# Patient Record
Sex: MALE | Race: BLACK OR AFRICAN AMERICAN
[De-identification: names, ages, dates, MRNs, and addresses within clinical notes are randomized per-mention and may not be internally consistent; named-entity substitution may affect disease eponyms.]

---

## 2019-09-26 ENCOUNTER — HOSPITAL ENCOUNTER (EMERGENCY)
Dept: HOSPITAL 76 - ED | Age: 24
Discharge: HOME | End: 2019-09-26
Payer: COMMERCIAL

## 2019-09-26 VITALS — SYSTOLIC BLOOD PRESSURE: 122 MMHG | DIASTOLIC BLOOD PRESSURE: 65 MMHG
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
## 2019-09-26 DIAGNOSIS — S02.2XXA: Primary | ICD-10-CM

## 2019-09-26 PROCEDURE — 70160 X-RAY EXAM OF NASAL BONES: CPT

## 2019-09-26 PROCEDURE — 99283 EMERGENCY DEPT VISIT LOW MDM: CPT

## 2019-09-26 PROCEDURE — 99284 EMERGENCY DEPT VISIT MOD MDM: CPT

## 2019-09-26 NOTE — XRAY REPORT
Reason:  came in contact w/ another player, injuring nose..

Procedure Date:  09/26/2019   

Accession Number:  [id number] / [id number]                    

Procedure:  XR  - Nasal Bones CPT Code:  

 

FULL RESULT:

 

 

EXAM:

NASAL BONES RADIOGRAPHY

 

EXAM DATE: 9/26/2019 09:08 PM.

 

CLINICAL HISTORY: Came in contact with another player, injuring nose.

 

COMPARISONS: None.

 

TECHNIQUE: 3 views.

 

FINDINGS:

Bones: There is a 2 mm fracture at the very tip of the nasal bone. The 

anterior nasal spine is intact.

 

Sinuses: Normal. No opacities or fluid levels.

 

Other: Normal. No soft tissue swelling.

IMPRESSION: 2 mm tip of nasal bone fracture fragment.

 

RADIA

## 2019-09-26 NOTE — ED PHYSICIAN DOCUMENTATION
History of Present Illness





- Stated complaint


Stated Complaint: NOSE INJURY





- Additonal information


Additional information: 


This is a 23-year-old male who denies past medical history who presents with 

nose pain.  Patient was playing football and another player's cheekbone impacted

his nose several hours ago.  Patient had some bleeding from the left side, he 

went home and tried to clean his nose and said he could feel the cartilage 

moving around, so he presented here for further evaluation.  He denies any loss 

of consciousness, facial pain other than on his nose, vision changes, or neck 

pain.





Review of Systems


Constitutional: denies: Fever


Eyes: denies: Loss of vision


Nose: reports: Epistaxis


Throat: denies: Sore throat





PD PAST MEDICAL HISTORY





- Past Medical History


Past Medical History: No





- Present Medications


Home Medications: 


                                Ambulatory Orders











 Medication  Instructions  Recorded  Confirmed


 


Acetaminophen [Tylenol] 650 mg PO Q6H PRN #30 tab 09/26/19 


 


Ibuprofen 600 mg PO Q6H PRN #30 tablet 09/26/19 














- Allergies


Allergies/Adverse Reactions: 


                                    Allergies











Allergy/AdvReac Type Severity Reaction Status Date / Time


 


clindamycin AdvReac  Anaphylaxis Verified 09/26/19 20:40














- Living Situation


Living Arrangement: reports: At home





- Family History


Family history: reports: Non contributory





PD ED PE NORMAL





- Vitals


Vital signs reviewed: Yes





- General


General: Alert and oriented X 3, No acute distress





- HEENT


HEENT: PERRL, Other (Mild symmetric nasal swelling and distal nose tenderness. 

No gross deformity. No nasal septal hematoma. No active bleeding. No facial 

tenderness with palpation of the maxilla, zygoma, forehead, jaw. )





- Neck


Neck: Supple, no meningeal sign





- Cardiac


Cardiac: RRR





- Respiratory


Respiratory: No respiratory distress





- Abdomen


Abdomen: Non distended





- Derm


Derm: Warm and dry





- Extremities


Extremities: No deformity





- Neuro


Neuro: Alert and oriented X 3





- Psych


Psych: Normal mood, Normal affect





Results





- Vitals


Vitals: 


                               Vital Signs - 24 hr











  09/26/19 09/26/19





  20:35 21:28


 


Temperature 37.3 C 36.9 C


 


Heart Rate 60 61


 


Respiratory 17 16





Rate  


 


Blood Pressure 133/109 H 122/65


 


O2 Saturation 100 98








                                     Oxygen











O2 Source                      Room air

















- Rads (name of study)


  ** Nasal XR


Radiology: Other (Distal nasal bone fracture.)





PD MEDICAL DECISION MAKING





- ED course


Complexity details: considered differential (Nasal fracture, epistaxis, soft 

tissue injury, facial fracture, concussion)


ED course: 


Patient presents with isolated trauma to his nose, he has no loss of 

consciousness, no signs of other significant trauma to his head or neck, he does

 not require head or neck imaging today.  He is face is stable, with no 

tenderness around the orbits, midface, or jaw, he has isolated mid-distal nasal 

tenderness. Nasal x-ray shows a 2 mm distal nasal fracture fragment.  I 

discussed the diagnosis of nasal fracture with the patient, and care for it with

 ice, and avoiding trauma.  I provided him contact information for our facial 

surgeon Dr. Velázquez, I explained that I also think it would be reasonable for him 

to follow-up with just his primary care provider to start given the mildness of 

this fracture. I reviewed return precautions including any recurrent/persistent 

epistaxis.  He has no signs of concussion or significant head injury.  There are

 no signs of nasal septal hematoma.  Patient's questions were answered and he 

was discharged home.








Departure





- Departure


Disposition: 01 Home, Self Care


Clinical Impression: 


Nasal bone fracture


Qualifiers:


 Encounter type: initial encounter Fracture type: closed Qualified Code(s): 

S02.2XXA - Fracture of nasal bones, initial encounter for closed fracture





Condition: Good


Instructions:  ED Fx Nasal Conf W X Ray


Follow-Up: 


Dennis Velázquez DDS [Provider Admit Priv/Credential] - As Needed


Prescriptions: 


Acetaminophen [Tylenol] 650 mg PO Q6H PRN #30 tab


 PRN Reason: Pain


Ibuprofen 600 mg PO Q6H PRN #30 tablet


 PRN Reason: Pain


Comments: 


You were seen today for nose pain, and you do have a small fracture of your 

nasal bone.  This will likely heal without issue. You may apply ice to it, and 

please avoid further trauma to the nose.  Please follow-up with your primary 

care provider.  I have also provided the information for a facial surgeon for 

follow up if needed.  Return to emergency department if you develop a persistent

 nosebleed that does not stop with direct pressure, or other concerning 

symptoms.


Discharge Date/Time: 09/26/19 21:45

## 2021-06-21 ENCOUNTER — HOSPITAL ENCOUNTER (OUTPATIENT)
Dept: HOSPITAL 76 - SC | Age: 26
Discharge: HOME | End: 2021-06-21
Attending: INTERNAL MEDICINE
Payer: COMMERCIAL

## 2021-06-21 VITALS — SYSTOLIC BLOOD PRESSURE: 130 MMHG | DIASTOLIC BLOOD PRESSURE: 80 MMHG

## 2021-06-21 DIAGNOSIS — R41.89: ICD-10-CM

## 2021-06-21 DIAGNOSIS — G47.10: ICD-10-CM

## 2021-06-21 DIAGNOSIS — R06.81: ICD-10-CM

## 2021-06-21 DIAGNOSIS — G47.8: ICD-10-CM

## 2021-06-21 DIAGNOSIS — E66.3: ICD-10-CM

## 2021-06-21 DIAGNOSIS — R06.83: Primary | ICD-10-CM

## 2021-06-21 PROCEDURE — 99212 OFFICE O/P EST SF 10 MIN: CPT

## 2021-06-21 PROCEDURE — 99202 OFFICE O/P NEW SF 15 MIN: CPT

## 2021-06-21 NOTE — SLEEP CARE CONSULTATION
Information from patient questionnaire entered by Bruna Engel.





I have reviewed and concur with the information entered by Bruna Engel. 

This document represents the service I personally performed and the decisions 

made by me, Miriam Beatty MD, [facility].





History of Present Illness


Service Date and Time: 06/21/2021    1435


Reason for Visit: New patient


Chief Complaint: reports: Insomnia, Snoring, Observed pauses in breathing, 

Frequent awakenings at night


Date of Onset: 4 years


Usual bedtime: 11:40pm 


Time it takes to fall asleep: 45 minutes


Snores at night: Yes


Observed to quit breathing while asleep: Yes


Sleeps alone due to snoring: Yes


Number of times waking at night: 3-4


Reasons for waking at night: reports: Snoring, Gasping for air, Bathroom, Other 

(numbness behind head, headaches, dreams)


Toss, Turn, or Twitch while sleeping: Yes


Recalls having dreams: Yes


Usually gets out of bed at: 6 - 6:30 am


Feels refreshed in the morning: Yes (sometimes)


Morning headache: Yes (resolves in 1-2 hours)


Sleepy or fatigued during the day: Yes (sometimes)


Ever fallen asleep while driving: Yes (sometimes)


Takes day naps: Yes (sometimes)


Dreams during day naps: Yes


Prior sleep studies: No


Additional HPI information: 


I had the pleasure of seeing Mr. Caban today regarding the possibility of him 

having a sleep disorder.  As you know, he is a 25 year old gentleman who 

complains of frequent awakenings, loud snore, and observed apneas.  The patient 

tells me that he normally goes to bed around 11:40 pm, and it takes him 

approximately 45 minutes to fall asleep.  He has been told that he snores loudly

and irregularly at night.  He has also been observed to stop breathing in his 

sleep.  His wife has to sleep in a separate room.  On the average he wakes up 3 

4 times a night.  Most of the time he wakes up because of having to use the 

bathroom.  He has awakened occasionally because of his own snoring, choking, and

having to gasp for air.  There is a lot of tossing and turning in his sleep.  He

has somniloquy (sleep talking) but not somnambulism (sleep walking).  Generally 

he can recall having dreams.  In the morning he usually gets up out of the bed 

around 6  6:30 a.m. not feeling refreshed nor rested.  He usually does have a 

morning headache lasting 1  2 hours.    During the day he complains of feeling 

sleepy and fatigued.  His score on [place] Sleepiness Scale is 13 out of 24.  He

has fallen asleep while driving and has gone out of the taina.  He usually does 

not take naps during the day.  He has had sleep paralysis.  He complains of res

tless leg syndrome.  He reports having impaired concentration during the day.








- Parasomnia Symptoms


Ever been unable to move upon waking from sleep: Yes (most times)


Walks in sleep: No


Talks in sleep: Yes (sometimes)


Ever acted out dreams in sleep: Yes


Ever felt weak in the knees when startled or emotional: No


Bothered by creepy, crawly, restless sensations in legs: Yes


Problems with memory or concentration: Yes





Subjective


Initial [place] Sleepiness Scale score: 13 (in 2021)





Social History


The patient's occupation is a Active . Patient is Single and lives in 

[place]. 





Have you smoked in the past 12 months: No


Alcohol use: No


Caffeine use: Yes


Caffeine amount and frequency: 1-2 drinks 5 days a week





Family History


Family history of sleep disordered breathing: Yes


Family Hx Sleep Apnea: Mother: Snoring, Father: Snoring, Sibling: Snoring, 

Grandparent: Snoring





Allergies and Home Medications


Drug allergies reviewed: Yes


Home medication list reviewed: Yes





Review of Systems


Weight gain over past 5 years: 22


Cardiovascular: reports: leg or foot swelling


Respiratory: denies: shortness of breath, wheeze, sputum production, chronic 

cough, other


Gastrointestinal: denies: heartburn, difficulty swallowing, nausea, vomitting, 

diarrhea, abdominal pain, other


Urinary: reports: frequency


Neurological: reports: headaches


Psychiatric: denies: Attention Deficit Hyperactivity, anxiety, depression, mood 

disorder, claustrophobia, other


Ear/Nose/Throat: reports: nasal congestion, sinus problems, dry mouth/throat, 

injury to nose, wisdom teeth removed


Endocrine: reports: too hot or cold


Musculoskeletal: reports: joint pain, neck pain, back pain


Immunologic: reports: sneezing, allergies to food or environment





Physical Exam


Vital signs obtained and entered by: Dr. Nikomborirak


Blood Pressure: 130/80


Cuff size: regular


Heart Rate: 52


O2 Saturation: 97


Height: 6 ft


Weight: 220 lb


Body Mass Index: 29.8


BMI Classification: Overweight


Neck circumference: 16


Mood/affect: normal


HEENT: No craniofacial malformation


Nostrils: partially obstructed


Turbinates: normal


Septum: deviated right


Mouth and throat: narrow oropharynx


Soft palate: long


Hard palate: normal


Uvula: normal


Uvula visualization: 25% Mallampati Class III


Tongue: normal in size


Tonsils: small


Chin and jaw: normal size and position


Neck: normal w/o lymphadenopathy or thyromegaly


Heart: regular rate and rhythm


Lungs: clear bilaterally


Abdomen: soft


Extremities: no edema or clubbing


Neurologic: intact





Impression and Plan


IMPRESSION: 1. Obstructive Sleep Apnea-Hypopnea Syndrome, as suggested by 

history of loud and irregular snoring, observed cessation of breath while 

asleep, frequent awakenings during the night, nocturnal choking, unrefreshed 

sleep, cognitive impairment, and daytime hypersomnolence. Narrow oropharynx and 

obesity are common predisposing factors for obstructive sleep apnea-hypopnea 

syndrome.  I recommend proceeding to polysomnography to confirm the diagnosis 

and to assess severity.  If he has significant sleep disordered breathing, a 

manual CPAP titration study will also be performed to find the optimal treatment

pressure.  I informed the patient of what the sleep studies involve and after 

some discussion, he agreed to proceed. 





Plan:  1. Schedule polysomnography + manual CPAP titration study and return in 1

to 2 weeks after the study to discuss result and initiate therapy.


   2. Avoid long distance driving or when feeling sleepy.


   3. Avoid alcohol, sedative and muscle relaxant around bedtime.


   4. Attempt to lose some weight.





Visit Type: In Office


Time Spent with Patient (minutes): 15


Provider Statement: I spent 100% of the Face to Face Visit with the patient with

greater than 50% spent counseling the patient and coordination of care.

## 2021-09-16 ENCOUNTER — HOSPITAL ENCOUNTER (OUTPATIENT)
Dept: HOSPITAL 76 - SC | Age: 26
Discharge: HOME | End: 2021-09-16
Attending: INTERNAL MEDICINE
Payer: COMMERCIAL

## 2021-09-16 DIAGNOSIS — R06.83: Primary | ICD-10-CM

## 2021-09-16 DIAGNOSIS — R41.89: ICD-10-CM

## 2021-09-16 DIAGNOSIS — R06.81: ICD-10-CM

## 2021-09-16 DIAGNOSIS — G47.8: ICD-10-CM

## 2021-09-16 DIAGNOSIS — G47.10: ICD-10-CM

## 2021-09-16 PROCEDURE — 95806 SLEEP STUDY UNATT&RESP EFFT: CPT

## 2021-09-23 ENCOUNTER — HOSPITAL ENCOUNTER (OUTPATIENT)
Dept: HOSPITAL 76 - SC | Age: 26
Discharge: HOME | End: 2021-09-23
Attending: NURSE PRACTITIONER
Payer: COMMERCIAL

## 2021-09-23 DIAGNOSIS — R06.83: Primary | ICD-10-CM

## 2021-09-23 DIAGNOSIS — G47.10: ICD-10-CM

## 2021-09-23 DIAGNOSIS — G47.8: ICD-10-CM

## 2021-09-23 DIAGNOSIS — R06.81: ICD-10-CM

## 2021-09-23 NOTE — SLEEP CARE CONSULTATION
Information from patient questionnaire entered by Ciara Anaya.





I have reviewed and concur with the information entered by Ciara Anaya. This 

document represents the service I personally performed and the decisions made by

me, Jesenia Valencia ARNP.





History of Present Illness


Service Date and Time: 09/23/2021    0900


Initial [place] Sleepiness Scale score: 13 (in 2021)


Current [place] Sleepiness Scale score: 6


Additional HPI information: 





NANDA SESAY returns via Video Telehealth visit for follow up and results of 

the recently performed home sleep study.





The patient was informed of the following findings:  No significant sleep diso

rdered breathing with an average AHI of 3.0 and dm oxygen saturation of 87%.





I explained the pathophysiology behind obstructive sleep apnea. Patient does not

have sleep apnea and was advised how weight gain could increase the risk of 

developing sleep apnea in the future. I strongly encouraged the patient to lose 

weight.





Patient has snoring. Snoring can be reduced by weight loss. Weight loss is best 

achieved with diet consult. Patient instructed to contact PCP for referral. 

Snoring can also be treated with an oral appliance from a dentist. Advised to 

check insurance coverage. In addition, an ENT evaluation can be do to see if 

other treatment is indicated. Patient does not drink alcohol.  Patient was 

cautioned about risks of drowsy driving until sleepiness symptoms resolve. 





Sleep Study





- Results


Type of Sleep Study: Home sleep study


Prior sleep studies: Yes


Year and Where: 9/2021 [facility]


Polysomnography/Home Sleep Study results: 





Physician Impression:


The quality of the study is good. The length of the study is adequate (> 240 

minutes). Please also see the


tabulated and graphic data.


1. No significant sleep disordered breathing, with an AHI of 3.0/hr and dm 

SaO2 of 87%. During the


study, the patient had 10 apneas (10 obstructive, 0 central, 0 mixed) and 10 

hypopneas. The longest


episode lasted 86.0 seconds. The few respiratory events occurred independently 

of body position


(supine AHI was 3.6 and non-supine, 2.71).


2. Hypoxemia (ICD-10 R09.02), minimal, with the lowest oxygen saturation of 87 %

and 2.1 minutes with


SaO2 under 90%. Baseline oxygen saturation was normal (Average oxygen saturation

was 94%).





Allergies and Home Medications


Home medication list reviewed: Yes (oxycodone for pain after surgery)





Review of Systems


Review of systems same as previous: No (nose surgery on 9-20-21)





Physical Exam


Vital signs obtained and entered by: Telehealth visit to reduce exposure during 

covid pandemic


Height: 6 ft





Impression and Plan





1. Suspected Obstructive Sleep Apnea-Hypopnea Syndrome, as suggested by a 

history of loud and irregular snoring, observed cessation of breath while 

asleep, gasping or choking in sleep, morning headache, frequent awakening during

the night, unrefreshed sleep, cognitive impairment, and excessive daytime 

sleepiness. Patient completed home study but felt he did not sleep normally that

night.  His symptoms are consistent with possible sleep apnea so I recommend 

proceeding to polysomnography to confirm the diagnosis and to assess severity. I

obtained agreement to proceed. The pathophysiology of obstructive sleep apnea-

hypopnea syndrome was discussed with the patient and health risks of 

cardiovascular and cerebrovascular disease if not treated. Risks of drowsy 

driving discussed in detail and patient advised to avoid long distance driving 

and to pull over at the first sign of drowsiness. Patient agreed to plan. 





* Schedule polysomnography +- manual CPAP titration study and return in 1-2 

  weeks after the study to discuss result and initiate therapy.


* Avoid long distance driving or driving when feeling sleepy.


* Avoid alcohol, sedative and muscle relaxant around bedtime.


* Attempt to lose weight.


* Review instructions provided by trained office staff on how to prepare for the

  sleep study.


* Return for follow-up after sleep study completed.








Counseling Topics: Weight loss health impact


Visit Type: Telehealth Video


Video Type: VSee


Patient Location: Home


Location of Provider: Office


Patient agrees and consents to this telehealth visit type: Yes


Patient agrees to have their insurance billed: Yes


Time Spent with Patient (minutes): 11


Provider Statement: I spent 100% of the Telehealth Video Call with the patient 

with greater than 50% spent counseling the patient and coordination of care.

## 2022-10-21 ENCOUNTER — HOSPITAL ENCOUNTER (OUTPATIENT)
Dept: HOSPITAL 76 - DI | Age: 27
Discharge: HOME | End: 2022-10-21
Attending: PHYSICIAN ASSISTANT
Payer: COMMERCIAL

## 2022-10-21 DIAGNOSIS — R22.41: ICD-10-CM

## 2022-10-21 DIAGNOSIS — M23.8X1: ICD-10-CM

## 2022-10-21 DIAGNOSIS — M70.51: ICD-10-CM

## 2022-10-21 DIAGNOSIS — S83.241A: ICD-10-CM

## 2022-10-21 DIAGNOSIS — M94.261: ICD-10-CM

## 2022-10-21 DIAGNOSIS — S83.241A: Primary | ICD-10-CM

## 2022-10-21 DIAGNOSIS — M76.892: ICD-10-CM

## 2022-10-21 DIAGNOSIS — S83.242A: Primary | ICD-10-CM

## 2022-10-21 DIAGNOSIS — M25.462: ICD-10-CM

## 2022-10-21 DIAGNOSIS — M94.262: ICD-10-CM

## 2022-10-21 DIAGNOSIS — M23.8X2: ICD-10-CM

## 2022-10-21 NOTE — MRI REPORT
PROCEDURE:  KNEE WO - RT

 

INDICATIONS:  [phone]

 

TECHNIQUE:  

Noncontrast sagittal PD fast spin echo and T2 fast spin echo with fat saturation, sagittal 3-D gradie
nt sequence with fat saturation; coronal T1 spin echo and PD fast spin echo with fat saturation, and 
axial PD fast spin echo with fat saturation through the knee.  

 

COMPARISON:  None.

 

FINDINGS:  

Image quality:  Excellent.  

 

Menisci: There is horizontal tear in the peripheral aspect of the posterior horn the medial meniscus.
 The lateral meniscus demonstrates normal morphology and internal signal.  The meniscal root ligament
s appear intact.  

 

Cruciate ligaments:  Mild mucoid degeneration of the anterior cruciate ligament. The posterior crucia
te ligament is intact.  

 

Medial structures:  The medial collateral ligament appears intact.  The semimembranosus tendon insert
ions and meniscocapsular junction appear intact.  Visualized portions of the pes anserinus tendons ap
pear normal. There is pes anserinus bursal fluid consistent with bursitis.  

 

Lateral structures:  The lateral collateral ligament and the biceps femoris tendon appear intact.  Th
e popliteus tendon appears normal.  Iliotibial band appears normal.  

 

Anterior structures:  The quadriceps and patellar tendons appear intact.  Patellar alignment is leigha
l.  No femoral trochlear dysplasia or ventral trochlear prominence.  No edema in the infrapatellar fa
t pad.  

 

Bones and cartilage:  No bone marrow contusions or fractures. Mild tricompartmental cartilage thinnin
g and fibrillation.  There is a 2.3 x 1.9 cm intramedullary mass in the proximal tibial metaphysis, d
emonstrating signal carries a 6 consistent with chondroid matrix, most likely an enchondroma.

 

Joint space:  There is small knee joint effusion.  No Baker's cyst.  Normal appearing synovial plicae
 are incidentally noted.  

 

IMPRESSION:

 

1. Horizontal tear of the peripheral aspect of the posterior horn of the medial meniscus.

2. Mild mucoid degeneration of ACL.

3. Pes pes anserinus bursitis.

4. Mild tricompartmental cartilage thinning and fibrillation.

5 A 2.3 x 1.9 cm intramedullary mass with chondroid matrix, most likely an enchondroma. Recommend a 2
-view x-ray of the tibia for further evaluation.

 

Reviewed by: Kaitlin Chao MD on 10/21/2022 2:35 PM PDT

Approved by: Kaitlin Chao MD on 10/21/2022 2:35 PM PDT

 

 

Station ID:  [id number]

## 2022-10-21 NOTE — MRI REPORT
PROCEDURE:  KNEE WO - LT

 

INDICATIONS:  KNEE PAIN

 

TECHNIQUE:  

Noncontrast sagittal PD fast spin echo and T2 fast spin echo with fat saturation, sagittal 3-D gradie
nt sequence with fat saturation; coronal T1 spin echo and PD fast spin echo with fat saturation, and 
axial PD fast spin echo with fat saturation through the knee.  

 

COMPARISON:  None.

 

FINDINGS:  

Image quality:  Excellent.  

 

Menisci: There is ramp tear involving the peripheral aspect of the posterior horn the medial meniscus
. There is edema in the posterior medial joint capsule. The lateral meniscus demonstrates normal morp
hology and internal signal.  The meniscal root ligaments appear intact.  

 

Cruciate ligaments:  The anterior cruciate ligament demonstrates striated appearance, likely secondar
y to mucoid degeneration. The posterior cruciate ligament appears intact.  

 

Medial structures:  The medial collateral ligament appears intact.  The semimembranosus tendon insert
ions and meniscocapsular junction appear intact.  Visualized portions of the pes anserinus tendons ap
pear normal.  No abnormal bursal fluid.  

 

Lateral structures:  The lateral collateral ligament, long and short heads of the biceps femoris tend
on appear intact.  The popliteus tendon appears normal.  Iliotibial band appears normal.  

 

Anterior structures:  The quadriceps and patellar tendons appear intact.  Mild quadriceps tendinitis.
 Patellar alignment is normal.  No femoral trochlear dysplasia or ventral trochlear prominence.  No e
stevie in the infrapatellar fat pad.  

 

Bones and cartilage:  No bone marrow contusions or fractures. There is tricompartmental cartilage thi
nning and fibrillation, most pronounced in the medial femorotibial compartment. There is full-thickne
ss cartilage defect in the weightbearing portion of the medial femoral condyle with associated reacti
ve subchondral edema.  

 

Joint space:  There is moderate-to-large knee joint effusion.  There is an oval thickening consistent
 with cellulitis. No Baker's cyst. Thickening of synovial plicae.  

 

IMPRESSION:  

 

1. Ramp tear of the posterior horn of the medial meniscus. There is edema in the posterior medial evelyn
nt capsule.

2. Mucoid degeneration of ACL.

3. Mild quadriceps tendinitis.

4. There is moderate to large knee joint effusion. There is synovial thickening consistent with synov
itis.

5. Tricompartmental cartilage thinning and fibrillation, most pronounced in the medial femorotibial c
ompartment. There is full-thickness cartilage defect in the weightbearing portion of the medial femor
al condyle.

 

Reviewed by: Kaitlin Chao MD on 10/21/2022 2:19 PM PDT

Approved by: Kaitlin Chao MD on 10/21/2022 2:19 PM PDT

 

 

Station ID:  [id number]